# Patient Record
Sex: MALE | Race: WHITE | NOT HISPANIC OR LATINO | Employment: FULL TIME | URBAN - METROPOLITAN AREA
[De-identification: names, ages, dates, MRNs, and addresses within clinical notes are randomized per-mention and may not be internally consistent; named-entity substitution may affect disease eponyms.]

---

## 2018-01-03 ENCOUNTER — TELEPHONE (OUTPATIENT)
Dept: CARDIOLOGY | Facility: MEDICAL CENTER | Age: 68
End: 2018-01-03

## 2018-01-03 DIAGNOSIS — I10 ESSENTIAL HYPERTENSION: ICD-10-CM

## 2018-01-03 DIAGNOSIS — E78.5 HYPERLIPIDEMIA, UNSPECIFIED HYPERLIPIDEMIA TYPE: ICD-10-CM

## 2018-01-03 DIAGNOSIS — I25.810 CORONARY ARTERY DISEASE INVOLVING CORONARY BYPASS GRAFT OF NATIVE HEART WITHOUT ANGINA PECTORIS: ICD-10-CM

## 2018-01-03 NOTE — TELEPHONE ENCOUNTER
----- Message from Santa Lr sent at 1/3/2018  8:50 AM PST -----  Regarding: Question about having lab work done for upcoming appt  IA/Priscila    Patient has an appt on 1/18 with Dr Neely and wants to find out if he needs to have lab work done for the appt. He can be reached at [phone].    ==========================================================================    CMP and Lipid profile ordered, lab slip mailed to pt.     Called pt and notified of the above information, pt verbalizes understanding.

## 2018-01-08 DIAGNOSIS — I25.810 CORONARY ARTERY DISEASE INVOLVING CORONARY BYPASS GRAFT OF NATIVE HEART WITHOUT ANGINA PECTORIS: ICD-10-CM

## 2018-01-08 DIAGNOSIS — E78.5 HYPERLIPIDEMIA, UNSPECIFIED HYPERLIPIDEMIA TYPE: ICD-10-CM

## 2018-01-08 DIAGNOSIS — I10 ESSENTIAL HYPERTENSION: ICD-10-CM

## 2018-01-18 ENCOUNTER — OFFICE VISIT (OUTPATIENT)
Dept: CARDIOLOGY | Facility: MEDICAL CENTER | Age: 68
End: 2018-01-18
Payer: MEDICARE

## 2018-01-18 VITALS
DIASTOLIC BLOOD PRESSURE: 70 MMHG | BODY MASS INDEX: 29.82 KG/M2 | HEIGHT: 73 IN | SYSTOLIC BLOOD PRESSURE: 130 MMHG | WEIGHT: 225 LBS | HEART RATE: 78 BPM | OXYGEN SATURATION: 98 %

## 2018-01-18 DIAGNOSIS — I65.23 BILATERAL CAROTID ARTERY STENOSIS: ICD-10-CM

## 2018-01-18 DIAGNOSIS — E78.00 PURE HYPERCHOLESTEROLEMIA: ICD-10-CM

## 2018-01-18 DIAGNOSIS — I73.9 PAD (PERIPHERAL ARTERY DISEASE) (HCC): ICD-10-CM

## 2018-01-18 DIAGNOSIS — I10 ESSENTIAL HYPERTENSION: ICD-10-CM

## 2018-01-18 DIAGNOSIS — I25.810 CORONARY ARTERY DISEASE INVOLVING CORONARY BYPASS GRAFT OF NATIVE HEART WITHOUT ANGINA PECTORIS: Primary | ICD-10-CM

## 2018-01-18 PROCEDURE — 99214 OFFICE O/P EST MOD 30 MIN: CPT | Performed by: INTERNAL MEDICINE

## 2018-01-18 RX ORDER — LISINOPRIL 20 MG/1
20 TABLET ORAL 2 TIMES DAILY
COMMUNITY

## 2018-01-18 ASSESSMENT — ENCOUNTER SYMPTOMS: CARDIOVASCULAR NEGATIVE: 1
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
## 2018-01-18 NOTE — LETTER
Name:          Blair Carroll   YOB: 1950  Date:     1/18/2018      Vahe Osorio M.D.  [address] [facility] [address]     Baltazar Neely MD  [address], [facility] [address]  [place] [address]  Phone: [phone]  Back Line: [phone]  Fax: [phone]  E-mail: [email or web address]   Dear Dr. Osorio,    We had the pleasure of seeing your patient, Blair Carroll, in Cardiology Clinic at [facility] today.    As you know, he is a 67-year-old man with a history of coronary artery disease status post PCI of his LAD in 1996 and 2-vessel CABG in 2010 (LIMA-LAD, SVG-OM) as well as hypertension, dyslipidemia, PAD with moderate disease of is legs (non-obstructive) and mild disease of his carotids and recently diagnosed type II diabetes.    He is doing well today, and has no cardiovascular complaints tolerating his medications well with excellent control of his blood pressures. I reviewed his labs including his LDL of 80 mg/dL having been 42 mg/dL last year approximately. I have not changed his dose of Crestor though I did recommend decreased red meat intake, increase physical activity in a mostly point based diet. He has plans to move to [facility]. If he is able to return to our clinic I am happy to see him. Otherwise, I will assume he is being taken care of down there.    Return in about 1 year (around 1/18/2019).    Thank you for the referral and please do not hesitate to contact me at any time. My contact information is listed above.    This note was dictated using Dragon speech recognition software.     A full note including my physical examination and a full list of rectified medications is available in our medical record, and can be faxed as well.    Baltazar Neely MD  Cardiologist  [facility]